# Patient Record
Sex: FEMALE | Race: OTHER | NOT HISPANIC OR LATINO | Employment: FULL TIME | URBAN - METROPOLITAN AREA
[De-identification: names, ages, dates, MRNs, and addresses within clinical notes are randomized per-mention and may not be internally consistent; named-entity substitution may affect disease eponyms.]

---

## 2017-10-12 DIAGNOSIS — G43.009 MIGRAINE WITHOUT AURA AND WITHOUT STATUS MIGRAINOSUS, NOT INTRACTABLE: ICD-10-CM

## 2017-10-12 DIAGNOSIS — G40.219 PARTIAL EPILEPSY WITH IMPAIRMENT OF CONSCIOUSNESS, INTRACTABLE (HCC): ICD-10-CM

## 2017-10-12 RX ORDER — ZONISAMIDE 100 MG/1
400 CAPSULE ORAL EVERY EVENING
Qty: 120 CAP | Refills: 11 | Status: SHIPPED | OUTPATIENT
Start: 2017-10-12 | End: 2018-10-05 | Stop reason: SDUPTHER

## 2017-10-13 NOTE — TELEPHONE ENCOUNTER
Was the patient seen in the last year in this department? No     Does patient have an active prescription for medications requested? Yes     Received Request Via: Patient         Patient called and stated she was unaware of the last two appointments scheduled with Dr. Ware in Oct. 2016 and Nov. 2016 and does not remember scheduling them; she was a no-show. She is scheduled to follow up with him in January 2018.

## 2018-01-25 ENCOUNTER — OFFICE VISIT (OUTPATIENT)
Dept: NEUROLOGY | Facility: MEDICAL CENTER | Age: 33
End: 2018-01-25
Payer: COMMERCIAL

## 2018-01-25 VITALS
BODY MASS INDEX: 30.97 KG/M2 | TEMPERATURE: 97.7 F | HEIGHT: 63 IN | OXYGEN SATURATION: 100 % | WEIGHT: 174.8 LBS | HEART RATE: 87 BPM | DIASTOLIC BLOOD PRESSURE: 62 MMHG | SYSTOLIC BLOOD PRESSURE: 116 MMHG | RESPIRATION RATE: 17 BRPM

## 2018-01-25 DIAGNOSIS — G43.009 MIGRAINE WITHOUT AURA AND WITHOUT STATUS MIGRAINOSUS, NOT INTRACTABLE: ICD-10-CM

## 2018-01-25 DIAGNOSIS — G40.219 PARTIAL EPILEPSY WITH IMPAIRMENT OF CONSCIOUSNESS, INTRACTABLE (HCC): Primary | ICD-10-CM

## 2018-01-25 PROCEDURE — 99214 OFFICE O/P EST MOD 30 MIN: CPT | Performed by: PSYCHIATRY & NEUROLOGY

## 2018-01-25 RX ORDER — ZOLMITRIPTAN 5 MG/1
SPRAY NASAL
Qty: 8 EACH | Refills: 1 | Status: SHIPPED | OUTPATIENT
Start: 2018-01-25 | End: 2020-01-06

## 2018-01-25 ASSESSMENT — ENCOUNTER SYMPTOMS
MEMORY LOSS: 0
INSOMNIA: 0
CONSTIPATION: 0
HEADACHES: 1
LOSS OF CONSCIOUSNESS: 0
DEPRESSION: 0
DIZZINESS: 1
SPUTUM PRODUCTION: 0
SEIZURES: 1

## 2018-01-25 ASSESSMENT — PAIN SCALES - GENERAL: PAINLEVEL: NO PAIN

## 2018-01-25 ASSESSMENT — PATIENT HEALTH QUESTIONNAIRE - PHQ9: CLINICAL INTERPRETATION OF PHQ2 SCORE: 0

## 2018-01-26 NOTE — PROGRESS NOTES
Subjective:      Shivani Wiley is a 32 y.o. female who presents for follow-up, last seen in April, 2016, with a history of simple partial seizures and migraine without aura.     HPI    Seizures: On Zonegran 400 mg daily at bedtime, when last seen, she had been seizure free for quite some time. In the interval since her last appointment, she was doing very well, and then things started to go south spontaneously about 4 weeks ago. She describes the events as a sudden metallic taste with some dizziness especially if she stands up too quickly, impaired concentration and headache. The latter is different than her migraines, occipital in location. Afterwards she is a little bit tired and slowed but she recovers. She had 2 of these events in rapid succession, this occurring on 2 consecutive days, she then was event free for 2 weeks, then she had a single event 2 weeks ago, none since. There is no diurnal pattern to their occurrence. She denies any atypical stressors, she has been compliant with medication, she was not started on any other new medications. She recalls that her seizures in the past did have a metallic taste and impaired concentration.    Migraine: Headaches also have continued in a more frequent pattern though over the longer term. They have not increased recently. She averages a headache twice a month, these now last a couple of days in duration. Off hormones, with an IUD in place, over the last several months, with her menses were occurring, she notes the headaches occurring consistently at that time. She had not refilled her Migranal nasal spray or her Zomig-ZMT rescue, using Anaprox only which provided limited benefit.    Medical, surgical and family histories are reviewed in the electronic health record, there are no new drug allergies. She has not had an EEG study done in quite some time. Socially, things are stable. She is still taking care of 2 children, working full-time as a nurse. She is on  "Zonegran 400 mg daily at bedtime, Anaprox  mg when necessary, she has Vicodin available, does not use it.    Review of Systems   Constitutional: Negative for malaise/fatigue.   Respiratory: Negative for sputum production.    Gastrointestinal: Negative for constipation.   Genitourinary: Negative for frequency and urgency.   Neurological: Positive for dizziness, seizures and headaches. Negative for loss of consciousness.   Psychiatric/Behavioral: Negative for depression and memory loss. The patient does not have insomnia.    All other systems reviewed and are negative.       Objective:     /62   Pulse 87   Temp 36.5 °C (97.7 °F)   Resp 17   Ht 1.6 m (5' 3\")   Wt 79.3 kg (174 lb 12.8 oz)   SpO2 100%   BMI 30.96 kg/m²      Physical Exam    She appears in no acute distress. Her vital signs are stable. There is no malar rash or temporal tenderness. The neck is supple, range of motion is full. Cardiac evaluation is unremarkable.    She is fully oriented, there is no aphasia or inattention. PERRLA/EOMI, visual fields are full, there is no facial asymmetry, bulbar dysfunction, the tongue and uvula are midline, sensory exam is intact across the midline to temperature, shoulder shrug and head rotation are intact. There is no tremor, asterixis, or drift. Tone is normal, strength is intact bilaterally. Reflexes are brisk and present at all points without asymmetries. There are no pathologic reflexes. There is no appendicular or truncal dystaxia. Fine motor control with the hands is also intact. There is no sensory loss to vibration.     Assessment/Plan:     1. Partial epilepsy with impairment of consciousness, intractable (CMS-[facility])  It sounds like these events she is now having are seizure recurrences, the disconcerting thing is simply a spontaneous recurrence without clear cause or provoking circumstance. Fortunately things seem to have attenuated, she will keep track of these events for now, I will maintain " Zonegran 400 mg daily at bedtime, but she was reassured we can increase the dose if the events were to begin to increase again. She is still safe to drive. An EEG will be ordered. Depending on these results, imaging of the brain may then be necessary. She was told we are looking for both nonconvulsive interictal activity, which would increase her risk into the future of having more seizures, as well as being able to identify and localize any abnormalities.    - REFERRAL TO NEURODIAGNOSTICS (EEG,EP,EMG/NCS/DBS) Modality Requested: EEG-Video    2. Migraine without aura and without status migrainosus, not intractable  We will use Zomig nasal spray as a more effective rescue. Twice a month headaches though not ideal, certainly do not warrant adjusting her maintenance therapies further, though we may need to go they're simply because of better seizure control if indicated. She is comfortable with this. The use of the nasal spray was demonstrated since technique is critical for efficacy. We will follow-up after her diagnostic studies done, phone contact in the interim.    - zolmitriptan (ZOMIG) 5 MG nasal solution; 1 spray at headache onset; repeat in 1 hour prn up to #2 spray in 24 hours.  Dispense: 8 Each; Refill: 1    Time: Evaluation of 25 minutes for exam come review, discussion, and education  Discussion: As mentioned in the assessment, over 60% of the time spent face-to-face counseling and coordination care

## 2018-05-30 ENCOUNTER — APPOINTMENT (OUTPATIENT)
Dept: NEUROLOGY | Facility: MEDICAL CENTER | Age: 33
End: 2018-05-30
Payer: COMMERCIAL

## 2018-09-11 ENCOUNTER — TELEPHONE (OUTPATIENT)
Dept: NEUROLOGY | Facility: MEDICAL CENTER | Age: 33
End: 2018-09-11

## 2018-09-11 NOTE — TELEPHONE ENCOUNTER
Patient called for Dr. Ware stating she has been in a bad migraine cycle for the last 2 weeks and her Zomig is not working for it. Is there a different medication she can try? Please advise.

## 2018-09-21 ENCOUNTER — NON-PROVIDER VISIT (OUTPATIENT)
Dept: NEUROLOGY | Facility: MEDICAL CENTER | Age: 33
End: 2018-09-21
Payer: COMMERCIAL

## 2018-09-21 DIAGNOSIS — G40.219 PARTIAL EPILEPSY WITH IMPAIRMENT OF CONSCIOUSNESS, INTRACTABLE (HCC): Primary | ICD-10-CM

## 2018-09-21 PROCEDURE — 95951 PR EEG MONITORING/VIDEORECORD: CPT | Performed by: PSYCHIATRY & NEUROLOGY

## 2018-09-26 NOTE — PROCEDURES
VIDEO ELECTROENCEPHALOGRAM REPORT      Referring provider: Dr. Castellon    DOS:  September 21, 2018, recording time 27 minutes    INDICATION:  Shivani Wiley 33 y.o. female presenting with a history of focal seizures without altered sensorium and migraine headache, now with recurrent events of metallic taste and dizziness. EEG being requested to rule out additional seizure activity and to help with localization.    CURRENT ANTIEPILEPTIC REGIMEN:  She is presently on Zonegran 400 mg daily at bedtime.    TECHNIQUE: 30 channel video electroencephalogram (EEG) was performed in accordance with the international 10-20 system. The study was reviewed in bipolar and referential montages. The recording examined the patient during wakeful and drowsy/sleep state(s).     DESCRIPTION OF THE RECORD:  During the wakefulness, the background showed a symmetrical 8-10 Hz alpha activity posteriorly with amplitude of ~70 mV.  There was reactivity to eye closure/opening.  A normal anterior-posterior gradient was noted with faster beta frequencies seen anteriorly. Brief intervals of slowing are seen consisting of high amplitude, sharply contoured theta activity with a right frontal localization. Typically less than one second duration, not associated with any change in the patients behavior and video.  During drowsiness, theta/delta frequencies were seen, the paroxysmal slowing becomes more sharply contoured, often preceded by sharp and slow wave complexes, now consisting of high amplitude delta wave forms, duration upwards of 3 seconds. During these discharges, there is a spontaneous contralateral left frontal activation seen.    During the sleep state, background shows diffuse high-amplitude 4-5 Hz delta activity.  Symmetrical high-amplitude sleep spindles and vertex sharps were seen in the leads over the central regions. The paroxysmal right frontal activity continues without increasing duration or frequency, always with the same  morphology. No sustained electroencephalographic seizure activity is seen during the tracing.     ACTIVATION PROCEDURES:   Hyperventilation was performed by the patient for a total of 3 minutes. The technician performing the test noted good effort. This technique produced electroencephalographic changes in keeping with the expected bilaterally synchronous, frontally predominant, high amplitude slow waves build up. There was no activation.     Intermittent Photic stimulation was performed in a stepwise fashion from 1 to 30 Hz and elicited minimal response (photic driving), most noticeable in the posterior leads. There was no activation.    EKG: sampling of the EKG recording demonstrated sinus rhythm.     INTERPRETATION:  This is an abnormal video EEG recording in the awake, drowsy/sleep state(s).  The findings are consistent with a right hemispheric localized epilepsy with rapid secondary generalization, and with rare left hemispheric, frontal involvement at outset. No evidence of nonconvulsive continuous electroencephalographic seizure activity is seen. Clinical correlation is recommended.        Khanh Ware M.D.    Procedures

## 2018-10-05 DIAGNOSIS — G40.219 PARTIAL EPILEPSY WITH IMPAIRMENT OF CONSCIOUSNESS, INTRACTABLE (HCC): ICD-10-CM

## 2018-10-05 DIAGNOSIS — G43.009 MIGRAINE WITHOUT AURA AND WITHOUT STATUS MIGRAINOSUS, NOT INTRACTABLE: ICD-10-CM

## 2018-10-08 RX ORDER — ZONISAMIDE 100 MG/1
400 CAPSULE ORAL EVERY EVENING
Qty: 120 CAP | Refills: 11 | Status: SHIPPED | OUTPATIENT
Start: 2018-10-08 | End: 2020-01-06 | Stop reason: SDUPTHER

## 2019-01-31 ENCOUNTER — APPOINTMENT (OUTPATIENT)
Dept: NEUROLOGY | Facility: MEDICAL CENTER | Age: 34
End: 2019-01-31
Payer: COMMERCIAL

## 2020-01-06 ENCOUNTER — OFFICE VISIT (OUTPATIENT)
Dept: NEUROLOGY | Facility: MEDICAL CENTER | Age: 35
End: 2020-01-06
Payer: COMMERCIAL

## 2020-01-06 VITALS
WEIGHT: 179 LBS | BODY MASS INDEX: 31.71 KG/M2 | SYSTOLIC BLOOD PRESSURE: 126 MMHG | HEART RATE: 68 BPM | HEIGHT: 63 IN | DIASTOLIC BLOOD PRESSURE: 80 MMHG | OXYGEN SATURATION: 99 %

## 2020-01-06 DIAGNOSIS — G40.219 PARTIAL EPILEPSY WITH IMPAIRMENT OF CONSCIOUSNESS, INTRACTABLE (HCC): Primary | ICD-10-CM

## 2020-01-06 DIAGNOSIS — G43.009 MIGRAINE WITHOUT AURA AND WITHOUT STATUS MIGRAINOSUS, NOT INTRACTABLE: ICD-10-CM

## 2020-01-06 PROCEDURE — 99213 OFFICE O/P EST LOW 20 MIN: CPT | Performed by: PSYCHIATRY & NEUROLOGY

## 2020-01-06 RX ORDER — ELETRIPTAN HYDROBROMIDE 40 MG/1
TABLET, FILM COATED ORAL
Qty: 9 TAB | Refills: 4 | Status: SHIPPED | OUTPATIENT
Start: 2020-01-06 | End: 2020-03-20

## 2020-01-06 RX ORDER — PROMETHAZINE HYDROCHLORIDE 25 MG/1
25 TABLET ORAL EVERY 4 HOURS PRN
Qty: 45 TAB | Refills: 1 | Status: SHIPPED | OUTPATIENT
Start: 2020-01-06 | End: 2021-02-08 | Stop reason: SDUPTHER

## 2020-01-06 RX ORDER — ZONISAMIDE 100 MG/1
400 CAPSULE ORAL EVERY EVENING
Qty: 120 CAP | Refills: 11 | Status: SHIPPED | OUTPATIENT
Start: 2020-01-06 | End: 2021-02-08 | Stop reason: SDUPTHER

## 2020-01-06 ASSESSMENT — ENCOUNTER SYMPTOMS
MEMORY LOSS: 0
PHOTOPHOBIA: 0
HEADACHES: 1
LOSS OF CONSCIOUSNESS: 0
SEIZURES: 0

## 2020-01-06 NOTE — PROGRESS NOTES
Subjective:      Shivani Wiley is a 34 y.o. female who presents with her , for follow-up, after nearly 2-year hiatus, with a history of migraine without aura and focal onset seizure with confusion and without motor symptoms.     HPI    Seizures: Still on Zonegran 400 mg nightly, she is compliant with the drug.  She is never had problems with tolerability.  Her EEG was done soon after last seen in January, 2018, and this showed a right hemispheric localization with rapid secondary generalization, localized mostly over the right temporal lobe.  There is no evidence of sustained nonconvulsive seizure activity.  She has not had MRI imaging of the brain done.    Migraine: She still has them maybe twice a month, but there has been a more consistent menstrual association.  She has a hormone-based IUD in place, and though she has only occasional menstrual flow, there is a consistent monthly pattern to when she gets her headaches.  The headaches can start while she is asleep, so she awakens already with significant pain and other symptoms.  When they do happen they still last about 2 days.    For treatment, she is already failed Topamax and Depakote, Migranal nasal spray originally provided some benefit but no longer this is the case.  Zomig 5 mg failed either as a ZMT formulation or nasal spray.    Medically, she has been having more more problems with dyspepsia, work-up has been rather thorough, essentially she is now banned from all NSAIDs.  She had been taking quite a lot for the headaches leading up to this.  Otherwise, surgical and family histories are reviewed in the electronic health record, there are no new drug allergies.  Other than Zonegran 40 mg nightly, she is on no other medications.    Review of Systems   Constitutional: Negative for malaise/fatigue.   Eyes: Negative for photophobia.   Neurological: Positive for headaches. Negative for seizures and loss of consciousness.   Psychiatric/Behavioral:  "Negative for memory loss.   All other systems reviewed and are negative.       Objective:     /80 (BP Location: Left arm, Patient Position: Sitting, BP Cuff Size: Adult)   Pulse 68   Ht 1.6 m (5' 3\")   Wt 81.2 kg (179 lb)   SpO2 99%   BMI 31.71 kg/m²      Physical Exam    She appears in no acute distress.  Her vital signs are stable.  There is no malar rash, jaw or temporal tenderness, jaw claudication, or allodynia.  The neck is supple, range of motion is full.  Carotid pulses are present bilaterally without asymmetry.  Cardiac evaluation is unremarkable.    Including mental status, cranial nerves, musculoskeletal, reflex, coordination, and since evaluations, her full neurologic examination is done and reveals no evidence of deficits nor toxicities.     Assessment/Plan:     1. Partial epilepsy with impairment of consciousness, intractable (HCC)  Clinically stable, imaging of the brain does need to be performed to better understand if there is a focal lesion involving the right hemisphere, and this could include MTS, migrational abnormalities or cortical dysgenesis.  This can be helpful in terms of prognostication though it certainly does not mean we need to change her medication regimen.  She understands that she will need to be on medication likely for the rest of her life.  She know circumstances that lower thresholds, this includes compliance or lack thereof with her medications. We will follow-up in 4 months following her MRI scan, we will call her with the results earlier if needed.    - zonisamide (ZONEGRAN) 100 MG Cap; Take 4 Caps by mouth every evening.  Dispense: 120 Cap; Refill: 11  - MR-BRAIN-W/O; Future    2. Migraine without aura and without status migrainosus, not intractable  Though she is stable, she is still needing some better rescue when a headache were to occur.  Relpax 40 mg to be taken with Phenergan 25 mg, this cocktail can be repeated within an hour if needed, of the interval can " be longer.  Side effects were reviewed, she was warned that Phenergan can make people sleepy.     - zonisamide (ZONEGRAN) 100 MG Cap; Take 4 Caps by mouth every evening.  Dispense: 120 Cap; Refill: 11  - eletriptan (RELPAX) 40 MG tablet; 1 tab at headache onset; repeat in 1 hour prn  Dispense: 9 Tab; Refill: 4  - promethazine (PHENERGAN) 25 MG Tab; Take 1 Tab by mouth every four hours as needed for Nausea/Vomiting.  Dispense: 45 Tab; Refill: 1    Time: 20-minute spent face-to-face for exam, review, discussion, and education, of this over 50% of the time spent counseling and coordinating care.

## 2020-01-09 ENCOUNTER — TELEPHONE (OUTPATIENT)
Dept: NEUROLOGY | Facility: MEDICAL CENTER | Age: 35
End: 2020-01-09

## 2020-01-09 NOTE — TELEPHONE ENCOUNTER
Patient called today and asked if we can fax her MRI of the Brain to [phone].  Faxed order to them.

## 2020-03-20 DIAGNOSIS — G43.009 MIGRAINE WITHOUT AURA AND WITHOUT STATUS MIGRAINOSUS, NOT INTRACTABLE: ICD-10-CM

## 2020-03-20 RX ORDER — RIZATRIPTAN BENZOATE 10 MG/1
TABLET, ORALLY DISINTEGRATING ORAL
Qty: 12 TAB | Refills: 6 | Status: SHIPPED | OUTPATIENT
Start: 2020-03-20 | End: 2020-05-13 | Stop reason: CLARIF

## 2020-03-20 NOTE — PROGRESS NOTES
Notification was received from the patient's pharmacy that Relpax was not covered, recommendations for Maxalt MLT 10 mg wafers as a covered drug were made, I will forward this prescription to the pharmacy as directed.  The patient can receive up to 18 tablets in a month.  A prescription will be written for 12 tablets/month, written to the pharmacy at [facility] in [place], [place].

## 2020-05-13 ENCOUNTER — TELEPHONE (OUTPATIENT)
Dept: NEUROLOGY | Facility: MEDICAL CENTER | Age: 35
End: 2020-05-13

## 2020-05-13 DIAGNOSIS — G43.009 MIGRAINE WITHOUT AURA AND WITHOUT STATUS MIGRAINOSUS, NOT INTRACTABLE: ICD-10-CM

## 2020-05-13 RX ORDER — RIZATRIPTAN BENZOATE 10 MG/1
TABLET ORAL
Qty: 10 TAB | Refills: 6 | Status: SHIPPED | OUTPATIENT
Start: 2020-05-13 | End: 2020-05-13

## 2020-05-13 RX ORDER — FROVATRIPTAN SUCCINATE 2.5 MG/1
TABLET, FILM COATED ORAL
Qty: 12 TAB | Refills: 2 | Status: SHIPPED | OUTPATIENT
Start: 2020-05-13 | End: 2021-02-08 | Stop reason: SDUPTHER

## 2020-05-13 NOTE — TELEPHONE ENCOUNTER
Dr. Ware,    Patient called on 5/13/2020 and stated that she has had a migraine since 05/07/2020 and stated that the medications are not working and would like to know if there is an alternative or what she can do?    Please Advise   MMR , 2019/7/21 10:36 , Federica Boyd (RN)

## 2020-05-14 NOTE — TELEPHONE ENCOUNTER
I will try another triptan, but if this fails, unfortunately it means she will need to go to an urgent care center for IV medications.  When she says nothing is working, I am assuming this means the Maxalt MLT that she was given as rescue.  She is already failed Imitrex and Zomig, I will try Frova.  I forwarded a prescription to the pharmacy.

## 2020-08-13 ENCOUNTER — APPOINTMENT (RX ONLY)
Dept: URBAN - METROPOLITAN AREA CLINIC 4 | Facility: CLINIC | Age: 35
Setting detail: DERMATOLOGY
End: 2020-08-13

## 2020-08-13 DIAGNOSIS — L81.4 OTHER MELANIN HYPERPIGMENTATION: ICD-10-CM

## 2020-08-13 DIAGNOSIS — I83.9 ASYMPTOMATIC VARICOSE VEINS OF LOWER EXTREMITIES: ICD-10-CM

## 2020-08-13 DIAGNOSIS — L81.1 CHLOASMA: ICD-10-CM

## 2020-08-13 PROBLEM — D48.5 NEOPLASM OF UNCERTAIN BEHAVIOR OF SKIN: Status: ACTIVE | Noted: 2020-08-13

## 2020-08-13 PROCEDURE — ? ADDITIONAL NOTES

## 2020-08-13 PROCEDURE — ? COUNSELING

## 2020-08-13 PROCEDURE — ? PRESCRIPTION

## 2020-08-13 PROCEDURE — 99202 OFFICE O/P NEW SF 15 MIN: CPT

## 2020-08-13 RX ORDER — FLUOCINOLONE ACETONIDE, HYDROQUINONE, AND TRETINOIN .1; 40; .5 MG/G; MG/G; MG/G
1 CREAM TOPICAL QHS
Qty: 1 | Refills: 3 | Status: ERX | COMMUNITY
Start: 2020-08-13

## 2020-08-13 RX ADMIN — FLUOCINOLONE ACETONIDE, HYDROQUINONE, AND TRETINOIN 1: .1; 40; .5 CREAM TOPICAL at 00:00

## 2020-08-13 ASSESSMENT — LOCATION SIMPLE DESCRIPTION DERM
LOCATION SIMPLE: INFERIOR FOREHEAD
LOCATION SIMPLE: LEFT FOREARM
LOCATION SIMPLE: RIGHT BREAST
LOCATION SIMPLE: RIGHT FOREHEAD

## 2020-08-13 ASSESSMENT — LOCATION DETAILED DESCRIPTION DERM
LOCATION DETAILED: INFERIOR MID FOREHEAD
LOCATION DETAILED: RIGHT LATERAL BREAST 10-11:00 REGION
LOCATION DETAILED: RIGHT INFERIOR MEDIAL FOREHEAD
LOCATION DETAILED: LEFT VENTRAL PROXIMAL FOREARM

## 2020-08-13 ASSESSMENT — LOCATION ZONE DERM
LOCATION ZONE: ARM
LOCATION ZONE: TRUNK
LOCATION ZONE: FACE

## 2020-08-13 NOTE — PROCEDURE: ADDITIONAL NOTES
Detail Level: Simple
Additional Notes: The importance of zinc based sunscreen was discussed, reapplication frequency emphasized
Additional Notes: will monitor

## 2021-02-08 ENCOUNTER — OFFICE VISIT (OUTPATIENT)
Dept: NEUROLOGY | Facility: MEDICAL CENTER | Age: 36
End: 2021-02-08
Attending: PSYCHIATRY & NEUROLOGY
Payer: COMMERCIAL

## 2021-02-08 VITALS
BODY MASS INDEX: 34.52 KG/M2 | SYSTOLIC BLOOD PRESSURE: 110 MMHG | OXYGEN SATURATION: 99 % | DIASTOLIC BLOOD PRESSURE: 76 MMHG | HEIGHT: 62 IN | HEART RATE: 68 BPM | WEIGHT: 187.61 LBS | TEMPERATURE: 98.4 F

## 2021-02-08 DIAGNOSIS — G43.009 MIGRAINE WITHOUT AURA AND WITHOUT STATUS MIGRAINOSUS, NOT INTRACTABLE: ICD-10-CM

## 2021-02-08 DIAGNOSIS — G40.219 PARTIAL EPILEPSY WITH IMPAIRMENT OF CONSCIOUSNESS, INTRACTABLE (HCC): Primary | ICD-10-CM

## 2021-02-08 PROCEDURE — 99214 OFFICE O/P EST MOD 30 MIN: CPT | Performed by: PSYCHIATRY & NEUROLOGY

## 2021-02-08 RX ORDER — PROMETHAZINE HYDROCHLORIDE 25 MG/1
25 TABLET ORAL EVERY 4 HOURS PRN
Qty: 45 TAB | Refills: 5 | Status: SHIPPED | OUTPATIENT
Start: 2021-02-08

## 2021-02-08 RX ORDER — ZONISAMIDE 100 MG/1
400 CAPSULE ORAL EVERY EVENING
Qty: 120 CAP | Refills: 11 | Status: SHIPPED | OUTPATIENT
Start: 2021-02-08 | End: 2021-10-14 | Stop reason: SDUPTHER

## 2021-02-08 RX ORDER — FROVATRIPTAN SUCCINATE 2.5 MG/1
TABLET, FILM COATED ORAL
Qty: 12 TAB | Refills: 11 | Status: SHIPPED | OUTPATIENT
Start: 2021-02-08 | End: 2022-03-21

## 2021-02-08 RX ORDER — UBROGEPANT 100 MG/1
1 TABLET ORAL PRN
Qty: 2 TAB | Refills: 0 | COMMUNITY
Start: 2021-02-08 | End: 2021-03-19 | Stop reason: SDUPTHER

## 2021-02-08 ASSESSMENT — PATIENT HEALTH QUESTIONNAIRE - PHQ9: CLINICAL INTERPRETATION OF PHQ2 SCORE: 0

## 2021-02-08 ASSESSMENT — ENCOUNTER SYMPTOMS
HEADACHES: 1
INSOMNIA: 0
LOSS OF CONSCIOUSNESS: 0
MEMORY LOSS: 0
SEIZURES: 0

## 2021-02-08 NOTE — PROGRESS NOTES
"Subjective:      Shivani Wiley is a 35 y.o. female who presents for follow-up, with a history of focal onset seizures and migraine without aura.     HPI    Seizures: Shivani states that she has had good seizure control.  On an occasion she will awaken in the morning having bitten the side of her tongue, but she denies headache or other malaise.  This is infrequent.  She is compliant with Zonegran 400 mg nightly.  She is still dealing with the a.m. grogginess from the drug.  She is compliant.    Migraine: Still with that menstrual association, she remains with her IUD, she is secondarily amenorrheic, but once a month the headaches still come back.  They can last 2 days or more, last year she actually had several occasions where the headache went for 5 days.  You tried Maxalt, she is already failed Imitrex and Zomig, this provided limited benefit.  She now uses Frova 2.5 mg which has provided the best benefit to date with Phenergan, at least the headaches do not last for days at a time.    Medical, surgical and family histories are reviewed, there are no new drug allergies.  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  Other than the Zonegran, Frova and Phenergan, and her hormone IUD, she is on no other medications.    Review of Systems   Constitutional: Negative for malaise/fatigue.   Neurological: Positive for headaches. Negative for seizures and loss of consciousness.   Psychiatric/Behavioral: Negative for memory loss. The patient does not have insomnia.    All other systems reviewed and are negative.       Objective:     /76 (BP Location: Right arm, Patient Position: Sitting, BP Cuff Size: Adult)   Pulse 68   Temp 36.9 °C (98.4 °F) (Temporal)   Ht 1.575 m (5' 2\")   Wt 85.1 kg (187 lb 9.8 oz)   SpO2 99%   BMI 34.31 kg/m²      Physical Exam    She appears in no acute " distress.  Her vital signs are stable.  There is no malar rash or jaw claudication.  There is no temporal tenderness.  The neck is supple, range of motion is full.  Cardiac evaluation is unremarkable.    Including mental status, cranial nerves, musculoskeletal, reflex, coordination, and since evaluations, the full neurologic examination is done and reveals no evidence of deficits or toxicities.     Assessment/Plan:     1. Partial epilepsy with impairment of consciousness, intractable (HCC)  Clinically stable, we will continue Zonegran 400 mg nightly.  The a.m. drugged feeling is part of the drug side effect, we talked about changing her to a different medicine, but given her past failures with Topamax and Depakote, she is still very uncomfortable with trying to change again.  She is dealing with the a.m. side effects, she is happy for now.  She is compliant.    - zonisamide (ZONEGRAN) 100 MG Cap; Take 4 Caps by mouth every evening.  Dispense: 120 Cap; Refill: 11    2. Migraine without aura and without status migrainosus, not intractable  The more active issue, we will try Ubrelvy 100 mg tablets instead of Frova, though she was told she can was rescue with the latter if the former proves ineffective.  A CGRP, Ubrelvy has been shown to be quite effective, very few side effects and no interactions with other medicines.  She will call the office to let us know how things move along in this regard.  Otherwise we can follow-up in 1 year.    - zonisamide (ZONEGRAN) 100 MG Cap; Take 4 Caps by mouth every evening.  Dispense: 120 Cap; Refill: 11  - Frovatriptan Succinate (FROVA) 2.5 MG Tab; 1 tab at headache onset; repeat in 1 hour prn  Dispense: 12 Tab; Refill: 11  - promethazine (PHENERGAN) 25 MG Tab; Take 1 Tab by mouth every four hours as needed for Nausea/Vomiting.  Dispense: 45 Tab; Refill: 5  - Ubrogepant (UBRELVY) 100 MG Tab; Take 1 Tab by mouth as needed (1 tab at headache osnet, repeat in 2 hour prn).  Dispense: 2  Tab; Refill: 0    Time: 20-minute spent face-to-face for exam, review, discussion, and education, of this over 50% of the time spent counseling and coordinating care.

## 2021-03-19 DIAGNOSIS — G43.009 MIGRAINE WITHOUT AURA AND WITHOUT STATUS MIGRAINOSUS, NOT INTRACTABLE: ICD-10-CM

## 2021-03-19 RX ORDER — UBROGEPANT 100 MG/1
1 TABLET ORAL PRN
Qty: 10 TABLET | Refills: 5 | Status: SHIPPED | OUTPATIENT
Start: 2021-03-19 | End: 2022-03-21

## 2021-06-08 ENCOUNTER — TELEPHONE (OUTPATIENT)
Dept: NEUROLOGY | Facility: MEDICAL CENTER | Age: 36
End: 2021-06-08

## 2021-06-08 NOTE — TELEPHONE ENCOUNTER
Patient called leaving a detailed message specifying she has dizzy the last two weeks in the morning , also feels very shaky .    Patient is wondering if that may be related to her current prescription regimen ?

## 2021-06-09 NOTE — TELEPHONE ENCOUNTER
Tell her that this is not likely due to the medications we are using.  She has been on stable doses for quite some time.  Something else is going on, she needs to talk with her PCP about this.

## 2021-07-31 ENCOUNTER — TELEPHONE (OUTPATIENT)
Dept: NEUROLOGY | Facility: MEDICAL CENTER | Age: 36
End: 2021-07-31

## 2021-11-12 ENCOUNTER — TELEPHONE (OUTPATIENT)
Dept: NEUROLOGY | Facility: MEDICAL CENTER | Age: 36
End: 2021-11-12

## 2021-11-12 NOTE — TELEPHONE ENCOUNTER
Ask Shivani if the Toradol shot from yesterday also helped the left facial numbness as well as the headache, though of course it would have been short-lived?

## 2021-11-12 NOTE — TELEPHONE ENCOUNTER
Per patient- had a Toradol shot and migraine cocktail yesterday. Medication only worked for one hour. Wanted to know if she have to go back to this office and get another shot or can you please prescribe a medication that will help her migraine and numbness on left side of the face? Patient live in [place]. Please advise. Thank you.BENJI Collazo.

## 2021-11-13 NOTE — TELEPHONE ENCOUNTER
Let her know that I am a little concerned about the new left-sided facial symptoms and there persistence along with a headache that is proving more refractory than usual.  She probably should get to an emergency room or an urgent care center, imaging of the brain should probably be done for thoroughness.  I doubt we will find anything significant, but you can never be sure.

## 2022-03-03 ENCOUNTER — TELEPHONE (OUTPATIENT)
Dept: NEUROLOGY | Facility: MEDICAL CENTER | Age: 37
End: 2022-03-03
Payer: COMMERCIAL

## 2022-03-03 DIAGNOSIS — G43.009 MIGRAINE WITHOUT AURA AND WITHOUT STATUS MIGRAINOSUS, NOT INTRACTABLE: ICD-10-CM

## 2022-03-03 DIAGNOSIS — G40.219 PARTIAL EPILEPSY WITH IMPAIRMENT OF CONSCIOUSNESS, INTRACTABLE (HCC): ICD-10-CM

## 2022-03-03 RX ORDER — ZONISAMIDE 100 MG/1
400 CAPSULE ORAL EVERY EVENING
Qty: 360 CAPSULE | Refills: 3 | Status: SHIPPED | OUTPATIENT
Start: 2022-03-03 | End: 2023-02-26

## 2022-03-03 NOTE — TELEPHONE ENCOUNTER
Received request via: Patient    Was the patient seen in the last year in this department? No     Does the patient have an active prescription (recently filled or refills available) for medication(s) requested? No     Patient message:  Can I please have a refill of Zonisamide. The pharmacy has sent a request 3 days ago. I have a scheduled appointment on 3/21.     I want to prevent going with out like the last time.     Thank you,   Shivani

## 2022-03-04 NOTE — TELEPHONE ENCOUNTER
We never got any request from the pharmacy as the pt was told. Regardless, the prescription was renewed today for the next year.

## 2022-03-21 ENCOUNTER — OFFICE VISIT (OUTPATIENT)
Dept: NEUROLOGY | Facility: MEDICAL CENTER | Age: 37
End: 2022-03-21
Attending: PSYCHIATRY & NEUROLOGY
Payer: COMMERCIAL

## 2022-03-21 ENCOUNTER — TELEPHONE (OUTPATIENT)
Dept: NEUROLOGY | Facility: MEDICAL CENTER | Age: 37
End: 2022-03-21

## 2022-03-21 VITALS
HEART RATE: 69 BPM | HEIGHT: 62 IN | SYSTOLIC BLOOD PRESSURE: 124 MMHG | WEIGHT: 191.36 LBS | DIASTOLIC BLOOD PRESSURE: 74 MMHG | TEMPERATURE: 97.3 F | BODY MASS INDEX: 35.21 KG/M2 | OXYGEN SATURATION: 99 %

## 2022-03-21 DIAGNOSIS — G40.219 PARTIAL EPILEPSY WITH IMPAIRMENT OF CONSCIOUSNESS, INTRACTABLE (HCC): ICD-10-CM

## 2022-03-21 DIAGNOSIS — G43.119 INTRACTABLE MIGRAINE WITH AURA WITHOUT STATUS MIGRAINOSUS: ICD-10-CM

## 2022-03-21 PROCEDURE — 99214 OFFICE O/P EST MOD 30 MIN: CPT | Performed by: PSYCHIATRY & NEUROLOGY

## 2022-03-21 PROCEDURE — 99211 OFF/OP EST MAY X REQ PHY/QHP: CPT | Performed by: PSYCHIATRY & NEUROLOGY

## 2022-03-21 RX ORDER — LASMIDITAN 100 MG/1
TABLET ORAL
Qty: 30 TABLET | Refills: 0 | COMMUNITY
Start: 2022-03-21 | End: 2022-04-19

## 2022-03-21 RX ORDER — FREMANEZUMAB-VFRM 225 MG/1.5ML
225 INJECTION SUBCUTANEOUS
Qty: 1 EACH | Refills: 11 | Status: SHIPPED | OUTPATIENT
Start: 2022-03-21 | End: 2023-01-02

## 2022-03-21 ASSESSMENT — ENCOUNTER SYMPTOMS
MEMORY LOSS: 1
PHOTOPHOBIA: 1
HEADACHES: 1
TINGLING: 1

## 2022-03-21 ASSESSMENT — PATIENT HEALTH QUESTIONNAIRE - PHQ9: CLINICAL INTERPRETATION OF PHQ2 SCORE: 0

## 2022-03-21 NOTE — TELEPHONE ENCOUNTER
Ajovy 225mg/1.5ml SOAJ    Request rec'd via DION, ran TC via Janny, MARIE paid copay $45.00 #1.5ml/30 DS or copay $135.00 #4.5ml/90 DS no $$ benefit as $45.00 per 30 DS notifying liaison Natalya Montesinos - patient lives in [place]. - 03/21/2022 4:00pm

## 2022-03-21 NOTE — PROGRESS NOTES
Subjective     Shivani Wiley is a 36 y.o. female who presents for follow-up, with a history of focal onset seizures with altered sensorium and migraine without aura, but who has begun to have an increase in headaches, now associated with left facial tingling sensations.     HPI    Migraine: Sophia had been having a consistent menstrual association with the headaches historically, but since last year, she has noted the headaches changing.  They have been increasing in frequency, she was now getting them between menses, still on an IUD with hormone, menses were typically some spotting.  She was now having near daily headaches for which Tylenol extra strength and Motrin 800 mg was being used consistently and more frequently, the former twice a day, the latter 4 times a day.  At least once every week she was having to reach for rescue medication, she has failed both Zomig and Imitrex, followed by Maxalt.  She was given samples of Ubrelvy which provided no benefit.  She essentially has no rescue medication available to her.    At some point she began to notice paresthesias involving the left side of the face, forehead and down the neck, occurring with the more severe headaches, though they do resolve as the headache improves.  She is also had baseline cognitive impairment, and heightened sensitivity to light, both on a near daily basis.    She did have an MRI scan of the brain done recently at her hospital, it was reportedly normal.  She was not having facial symptoms at the time of the scan itself.    Review of the records indicates that she has failed Topamax and Depakote because of side effects and adverse events.  She has not been on any other maintenance therapies since there had been no need.  During this time she has lost her sister to an intracranial hemorrhage, also increasing amounts of stress related to work at [employer].    Seizures: This at least has been stable, she is on Zonegran 400 mg  "nightly.  The problem has been getting the medication refilled for her on a timely basis.  She has called this office, responses have been slow, but also has had problems getting the pharmacy to give her the medication once it was called in.  Fortunately she has had no seizure recurrences.  She tolerates the Zonegran without major issue.     Medical, surgical and family histories are reviewed, there are no new drug allergies.  She is on Zonegran 400 mg nightly, Motrin 800 mg/Tylenol Extra Strength as needed, and Phenergan as needed.    Review of Systems   Eyes: Positive for photophobia.   Neurological: Positive for tingling and headaches.   Psychiatric/Behavioral: Positive for memory loss.   All other systems reviewed and are negative.      Objective     /74 (BP Location: Right arm, Patient Position: Sitting, BP Cuff Size: Adult)   Pulse 69   Temp 36.3 °C (97.3 °F) (Temporal)   Ht 1.575 m (5' 2\")   Wt 86.8 kg (191 lb 5.8 oz)   SpO2 99%   BMI 35.00 kg/m²      Physical Exam    She appears in no acute distress.  Vital signs are stable.  She is quite cooperative.  There is no malar rash, temporal or joint tenderness, or jaw claudication.  The neck is supple, range of motion is full.  Carotid pulses are present without asymmetry.  Cardiac evaluation is unremarkable.      Neurological Exam    In quick and cursory fashion, mental status, cranial nerve, musculoskeletal, coordination, and sensory evaluations are all intact.    Assessment & Plan     1. Intractable migraine with aura without status migrainosus  Headaches have taken off, he now has an associated aura, which can occur in about 18% of migraine suffers.  Quite often this happens in patients who have never had aura with her headaches for years.  MRI imaging is ruled out underlying structural pathology, obviously the symptoms are not related to her seizures, though the seizures have been a right hemispheric localization based on previous EEG.  In any " case, she does warrant preventative therapies, she is now fitting criteria for chronic migraine.    We spoke at length about treatment options available for chronic migraine.  I recommended Ajovy or Vyepti, she is opting for the former.  A 225 mg subcu injection every 4 weeks, this can also be given as a single time, 3 injection administration, every 3 months.  Side effects were reviewed.  Co-pay card was provided.  For rescue Reyvow 100 mg tablets are provided as samples, taken at pain onset and then repeated once in 24 hours.  Side effects were also reviewed.  She was reassured that using both these medicines are safe in patients with epilepsy, they do not interact with the Zonegran itself.  A co-pay card for the Reyvow was also provided.    - Fremanezumab-vfrm (AJOVY) 225 MG/1.5ML Solution Auto-injector; Inject 225 mg under the skin every 4 weeks.  Dispense: 1 Each; Refill: 11  - Lasmiditan Succinate (REYVOW) 100 MG Tab; 1 tab at headache onset; repeat once in 24 hours  Dispense: 30 Tablet; Refill: 0    2. Partial epilepsy with impairment of consciousness, intractable (HCC)  We will continue the Zonegran unchanged.  Hopefully, moving forwards, there will be as many problems obtaining medication when she calls in for refills.  We will follow-up in 6 months.    Time: 30 minutes in total spent in patient care including free charting, record review, discussions with healthcare staff and documentation.  This includes face-to-face time with the patient for exam, review, discussion, as well as counseling and coordinating care.

## 2022-08-03 ENCOUNTER — PRE-ADMISSION TESTING (OUTPATIENT)
Dept: ADMISSIONS | Facility: MEDICAL CENTER | Age: 37
End: 2022-08-03
Attending: OTOLARYNGOLOGY
Payer: COMMERCIAL

## 2022-08-23 ENCOUNTER — ANESTHESIA EVENT (OUTPATIENT)
Dept: SURGERY | Facility: MEDICAL CENTER | Age: 37
End: 2022-08-23
Payer: COMMERCIAL

## 2022-08-23 ENCOUNTER — PHARMACY VISIT (OUTPATIENT)
Dept: PHARMACY | Facility: MEDICAL CENTER | Age: 37
End: 2022-08-23
Payer: COMMERCIAL

## 2022-08-23 ENCOUNTER — ANESTHESIA (OUTPATIENT)
Dept: SURGERY | Facility: MEDICAL CENTER | Age: 37
End: 2022-08-23
Payer: COMMERCIAL

## 2022-08-23 ENCOUNTER — HOSPITAL ENCOUNTER (OUTPATIENT)
Facility: MEDICAL CENTER | Age: 37
End: 2022-08-23
Attending: OTOLARYNGOLOGY | Admitting: OTOLARYNGOLOGY
Payer: COMMERCIAL

## 2022-08-23 VITALS
WEIGHT: 184.97 LBS | TEMPERATURE: 97.3 F | DIASTOLIC BLOOD PRESSURE: 62 MMHG | HEIGHT: 62 IN | HEART RATE: 82 BPM | RESPIRATION RATE: 18 BRPM | OXYGEN SATURATION: 90 % | BODY MASS INDEX: 34.04 KG/M2 | SYSTOLIC BLOOD PRESSURE: 126 MMHG

## 2022-08-23 DIAGNOSIS — G89.18 POSTOPERATIVE PAIN: ICD-10-CM

## 2022-08-23 LAB — HCG UR QL: NEGATIVE

## 2022-08-23 PROCEDURE — A9270 NON-COVERED ITEM OR SERVICE: HCPCS | Performed by: STUDENT IN AN ORGANIZED HEALTH CARE EDUCATION/TRAINING PROGRAM

## 2022-08-23 PROCEDURE — 700101 HCHG RX REV CODE 250: Performed by: STUDENT IN AN ORGANIZED HEALTH CARE EDUCATION/TRAINING PROGRAM

## 2022-08-23 PROCEDURE — RXMED WILLOW AMBULATORY MEDICATION CHARGE: Performed by: OTOLARYNGOLOGY

## 2022-08-23 PROCEDURE — 700111 HCHG RX REV CODE 636 W/ 250 OVERRIDE (IP): Performed by: STUDENT IN AN ORGANIZED HEALTH CARE EDUCATION/TRAINING PROGRAM

## 2022-08-23 PROCEDURE — 700105 HCHG RX REV CODE 258: Performed by: OTOLARYNGOLOGY

## 2022-08-23 PROCEDURE — 160025 RECOVERY II MINUTES (STATS): Performed by: OTOLARYNGOLOGY

## 2022-08-23 PROCEDURE — 160046 HCHG PACU - 1ST 60 MINS PHASE II: Performed by: OTOLARYNGOLOGY

## 2022-08-23 PROCEDURE — 160035 HCHG PACU - 1ST 60 MINS PHASE I: Performed by: OTOLARYNGOLOGY

## 2022-08-23 PROCEDURE — 81025 URINE PREGNANCY TEST: CPT

## 2022-08-23 PROCEDURE — 700101 HCHG RX REV CODE 250: Performed by: OTOLARYNGOLOGY

## 2022-08-23 PROCEDURE — 160048 HCHG OR STATISTICAL LEVEL 1-5: Performed by: OTOLARYNGOLOGY

## 2022-08-23 PROCEDURE — 700111 HCHG RX REV CODE 636 W/ 250 OVERRIDE (IP)

## 2022-08-23 PROCEDURE — 700102 HCHG RX REV CODE 250 W/ 637 OVERRIDE(OP): Performed by: STUDENT IN AN ORGANIZED HEALTH CARE EDUCATION/TRAINING PROGRAM

## 2022-08-23 PROCEDURE — 160002 HCHG RECOVERY MINUTES (STAT): Performed by: OTOLARYNGOLOGY

## 2022-08-23 PROCEDURE — 160028 HCHG SURGERY MINUTES - 1ST 30 MINS LEVEL 3: Performed by: OTOLARYNGOLOGY

## 2022-08-23 PROCEDURE — 160009 HCHG ANES TIME/MIN: Performed by: OTOLARYNGOLOGY

## 2022-08-23 PROCEDURE — 160039 HCHG SURGERY MINUTES - EA ADDL 1 MIN LEVEL 3: Performed by: OTOLARYNGOLOGY

## 2022-08-23 PROCEDURE — 00160 ANES PX NOSE&SINUS NOS: CPT | Performed by: STUDENT IN AN ORGANIZED HEALTH CARE EDUCATION/TRAINING PROGRAM

## 2022-08-23 RX ORDER — EPINEPHRINE 1 MG/ML
INJECTION INTRAMUSCULAR; INTRAVENOUS; SUBCUTANEOUS
Status: DISCONTINUED
Start: 2022-08-23 | End: 2022-08-23 | Stop reason: HOSPADM

## 2022-08-23 RX ORDER — HYDROMORPHONE HYDROCHLORIDE 1 MG/ML
0.2 INJECTION, SOLUTION INTRAMUSCULAR; INTRAVENOUS; SUBCUTANEOUS
Status: DISCONTINUED | OUTPATIENT
Start: 2022-08-23 | End: 2022-08-23 | Stop reason: HOSPADM

## 2022-08-23 RX ORDER — HYDRALAZINE HYDROCHLORIDE 20 MG/ML
5 INJECTION INTRAMUSCULAR; INTRAVENOUS
Status: DISCONTINUED | OUTPATIENT
Start: 2022-08-23 | End: 2022-08-23 | Stop reason: HOSPADM

## 2022-08-23 RX ORDER — EPINEPHRINE 1 MG/ML(1)
AMPUL (ML) INJECTION
Status: DISCONTINUED
Start: 2022-08-23 | End: 2022-08-23 | Stop reason: HOSPADM

## 2022-08-23 RX ORDER — SODIUM CHLORIDE, SODIUM LACTATE, POTASSIUM CHLORIDE, CALCIUM CHLORIDE 600; 310; 30; 20 MG/100ML; MG/100ML; MG/100ML; MG/100ML
INJECTION, SOLUTION INTRAVENOUS CONTINUOUS
Status: DISCONTINUED | OUTPATIENT
Start: 2022-08-23 | End: 2022-08-23 | Stop reason: HOSPADM

## 2022-08-23 RX ORDER — ACETAMINOPHEN 500 MG
1000 TABLET ORAL ONCE
Status: COMPLETED | OUTPATIENT
Start: 2022-08-23 | End: 2022-08-23

## 2022-08-23 RX ORDER — EPINEPHRINE 1 MG/ML(1)
AMPUL (ML) INJECTION
Status: DISCONTINUED | OUTPATIENT
Start: 2022-08-23 | End: 2022-08-23 | Stop reason: HOSPADM

## 2022-08-23 RX ORDER — DEXAMETHASONE SODIUM PHOSPHATE 4 MG/ML
INJECTION, SOLUTION INTRA-ARTICULAR; INTRALESIONAL; INTRAMUSCULAR; INTRAVENOUS; SOFT TISSUE PRN
Status: DISCONTINUED | OUTPATIENT
Start: 2022-08-23 | End: 2022-08-23 | Stop reason: SURG

## 2022-08-23 RX ORDER — HYDROCODONE BITARTRATE AND ACETAMINOPHEN 5; 325 MG/1; MG/1
1 TABLET ORAL EVERY 4 HOURS PRN
Qty: 15 TABLET | Refills: 0 | Status: SHIPPED | OUTPATIENT
Start: 2022-08-23 | End: 2022-08-28

## 2022-08-23 RX ORDER — DIPHENHYDRAMINE HYDROCHLORIDE 50 MG/ML
12.5 INJECTION INTRAMUSCULAR; INTRAVENOUS
Status: DISCONTINUED | OUTPATIENT
Start: 2022-08-23 | End: 2022-08-23 | Stop reason: HOSPADM

## 2022-08-23 RX ORDER — HYDROMORPHONE HYDROCHLORIDE 2 MG/ML
INJECTION, SOLUTION INTRAMUSCULAR; INTRAVENOUS; SUBCUTANEOUS PRN
Status: DISCONTINUED | OUTPATIENT
Start: 2022-08-23 | End: 2022-08-23 | Stop reason: SURG

## 2022-08-23 RX ORDER — ONDANSETRON 2 MG/ML
INJECTION INTRAMUSCULAR; INTRAVENOUS PRN
Status: DISCONTINUED | OUTPATIENT
Start: 2022-08-23 | End: 2022-08-23 | Stop reason: SURG

## 2022-08-23 RX ORDER — ALBUTEROL SULFATE 2.5 MG/3ML
2.5 SOLUTION RESPIRATORY (INHALATION)
Status: DISCONTINUED | OUTPATIENT
Start: 2022-08-23 | End: 2022-08-23 | Stop reason: HOSPADM

## 2022-08-23 RX ORDER — OXYCODONE HCL 5 MG/5 ML
10 SOLUTION, ORAL ORAL
Status: DISCONTINUED | OUTPATIENT
Start: 2022-08-23 | End: 2022-08-23 | Stop reason: HOSPADM

## 2022-08-23 RX ORDER — LIDOCAINE HYDROCHLORIDE AND EPINEPHRINE 10; 10 MG/ML; UG/ML
INJECTION, SOLUTION INFILTRATION; PERINEURAL
Status: DISCONTINUED | OUTPATIENT
Start: 2022-08-23 | End: 2022-08-23 | Stop reason: HOSPADM

## 2022-08-23 RX ORDER — METOCLOPRAMIDE HYDROCHLORIDE 5 MG/ML
INJECTION INTRAMUSCULAR; INTRAVENOUS PRN
Status: DISCONTINUED | OUTPATIENT
Start: 2022-08-23 | End: 2022-08-23 | Stop reason: SURG

## 2022-08-23 RX ORDER — GABAPENTIN 300 MG/1
300 CAPSULE ORAL ONCE
Status: COMPLETED | OUTPATIENT
Start: 2022-08-23 | End: 2022-08-23

## 2022-08-23 RX ORDER — CEFAZOLIN SODIUM 1 G/3ML
INJECTION, POWDER, FOR SOLUTION INTRAMUSCULAR; INTRAVENOUS PRN
Status: DISCONTINUED | OUTPATIENT
Start: 2022-08-23 | End: 2022-08-23 | Stop reason: SURG

## 2022-08-23 RX ORDER — HYDROMORPHONE HYDROCHLORIDE 1 MG/ML
0.4 INJECTION, SOLUTION INTRAMUSCULAR; INTRAVENOUS; SUBCUTANEOUS
Status: DISCONTINUED | OUTPATIENT
Start: 2022-08-23 | End: 2022-08-23 | Stop reason: HOSPADM

## 2022-08-23 RX ORDER — MIDAZOLAM HYDROCHLORIDE 1 MG/ML
INJECTION INTRAMUSCULAR; INTRAVENOUS PRN
Status: DISCONTINUED | OUTPATIENT
Start: 2022-08-23 | End: 2022-08-23 | Stop reason: SURG

## 2022-08-23 RX ORDER — OXYCODONE HCL 10 MG/1
10 TABLET, FILM COATED, EXTENDED RELEASE ORAL ONCE
Status: COMPLETED | OUTPATIENT
Start: 2022-08-23 | End: 2022-08-23

## 2022-08-23 RX ORDER — KETOROLAC TROMETHAMINE 30 MG/ML
INJECTION, SOLUTION INTRAMUSCULAR; INTRAVENOUS PRN
Status: DISCONTINUED | OUTPATIENT
Start: 2022-08-23 | End: 2022-08-23 | Stop reason: SURG

## 2022-08-23 RX ORDER — ROCURONIUM BROMIDE 10 MG/ML
INJECTION, SOLUTION INTRAVENOUS PRN
Status: DISCONTINUED | OUTPATIENT
Start: 2022-08-23 | End: 2022-08-23 | Stop reason: SURG

## 2022-08-23 RX ORDER — PHENYLEPHRINE HCL IN 0.9% NACL 0.5 MG/5ML
SYRINGE (ML) INTRAVENOUS PRN
Status: DISCONTINUED | OUTPATIENT
Start: 2022-08-23 | End: 2022-08-23 | Stop reason: SURG

## 2022-08-23 RX ORDER — HYDROMORPHONE HYDROCHLORIDE 1 MG/ML
0.1 INJECTION, SOLUTION INTRAMUSCULAR; INTRAVENOUS; SUBCUTANEOUS
Status: DISCONTINUED | OUTPATIENT
Start: 2022-08-23 | End: 2022-08-23 | Stop reason: HOSPADM

## 2022-08-23 RX ORDER — HALOPERIDOL 5 MG/ML
1 INJECTION INTRAMUSCULAR
Status: DISCONTINUED | OUTPATIENT
Start: 2022-08-23 | End: 2022-08-23 | Stop reason: HOSPADM

## 2022-08-23 RX ORDER — LIDOCAINE HYDROCHLORIDE 20 MG/ML
INJECTION, SOLUTION EPIDURAL; INFILTRATION; INTRACAUDAL; PERINEURAL PRN
Status: DISCONTINUED | OUTPATIENT
Start: 2022-08-23 | End: 2022-08-23 | Stop reason: SURG

## 2022-08-23 RX ORDER — ONDANSETRON 2 MG/ML
4 INJECTION INTRAMUSCULAR; INTRAVENOUS
Status: DISCONTINUED | OUTPATIENT
Start: 2022-08-23 | End: 2022-08-23 | Stop reason: HOSPADM

## 2022-08-23 RX ORDER — LIDOCAINE HYDROCHLORIDE 10 MG/ML
INJECTION, SOLUTION INFILTRATION; PERINEURAL
Status: DISCONTINUED
Start: 2022-08-23 | End: 2022-08-23 | Stop reason: HOSPADM

## 2022-08-23 RX ORDER — OXYCODONE HCL 5 MG/5 ML
5 SOLUTION, ORAL ORAL
Status: DISCONTINUED | OUTPATIENT
Start: 2022-08-23 | End: 2022-08-23 | Stop reason: HOSPADM

## 2022-08-23 RX ORDER — OXYMETAZOLINE HYDROCHLORIDE 0.05 G/100ML
SPRAY NASAL
Status: DISCONTINUED
Start: 2022-08-23 | End: 2022-08-23 | Stop reason: HOSPADM

## 2022-08-23 RX ADMIN — DEXAMETHASONE SODIUM PHOSPHATE 4 MG: 4 INJECTION, SOLUTION INTRA-ARTICULAR; INTRALESIONAL; INTRAMUSCULAR; INTRAVENOUS; SOFT TISSUE at 08:08

## 2022-08-23 RX ADMIN — SODIUM CHLORIDE, POTASSIUM CHLORIDE, SODIUM LACTATE AND CALCIUM CHLORIDE: 600; 310; 30; 20 INJECTION, SOLUTION INTRAVENOUS at 08:02

## 2022-08-23 RX ADMIN — MIDAZOLAM HYDROCHLORIDE 2 MG: 1 INJECTION, SOLUTION INTRAMUSCULAR; INTRAVENOUS at 08:03

## 2022-08-23 RX ADMIN — ACETAMINOPHEN 1000 MG: 500 TABLET ORAL at 07:39

## 2022-08-23 RX ADMIN — CEFAZOLIN 2 G: 330 INJECTION, POWDER, FOR SOLUTION INTRAMUSCULAR; INTRAVENOUS at 08:08

## 2022-08-23 RX ADMIN — FENTANYL CITRATE 100 MCG: 50 INJECTION, SOLUTION INTRAMUSCULAR; INTRAVENOUS at 08:06

## 2022-08-23 RX ADMIN — METOCLOPRAMIDE 10 MG: 5 INJECTION, SOLUTION INTRAMUSCULAR; INTRAVENOUS at 08:50

## 2022-08-23 RX ADMIN — ONDANSETRON 4 MG: 2 INJECTION INTRAMUSCULAR; INTRAVENOUS at 08:50

## 2022-08-23 RX ADMIN — OXYCODONE HYDROCHLORIDE 10 MG: 10 TABLET, FILM COATED, EXTENDED RELEASE ORAL at 07:39

## 2022-08-23 RX ADMIN — GABAPENTIN 300 MG: 300 CAPSULE ORAL at 07:40

## 2022-08-23 RX ADMIN — PROPOFOL 150 MG: 10 INJECTION, EMULSION INTRAVENOUS at 08:06

## 2022-08-23 RX ADMIN — HYDROMORPHONE HYDROCHLORIDE 0.5 MG: 2 INJECTION INTRAMUSCULAR; INTRAVENOUS; SUBCUTANEOUS at 08:21

## 2022-08-23 RX ADMIN — KETOROLAC TROMETHAMINE 30 MG: 30 INJECTION, SOLUTION INTRAMUSCULAR at 08:50

## 2022-08-23 RX ADMIN — Medication 100 MCG: at 08:29

## 2022-08-23 RX ADMIN — ROCURONIUM BROMIDE 50 MG: 10 INJECTION, SOLUTION INTRAVENOUS at 08:06

## 2022-08-23 RX ADMIN — SUGAMMADEX 200 MG: 100 INJECTION, SOLUTION INTRAVENOUS at 09:04

## 2022-08-23 RX ADMIN — LIDOCAINE HYDROCHLORIDE 60 MG: 20 INJECTION, SOLUTION EPIDURAL; INFILTRATION; INTRACAUDAL at 08:06

## 2022-08-23 RX ADMIN — SODIUM CHLORIDE, POTASSIUM CHLORIDE, SODIUM LACTATE AND CALCIUM CHLORIDE: 600; 310; 30; 20 INJECTION, SOLUTION INTRAVENOUS at 07:41

## 2022-08-23 RX ADMIN — HYDROMORPHONE HYDROCHLORIDE 0.5 MG: 2 INJECTION INTRAMUSCULAR; INTRAVENOUS; SUBCUTANEOUS at 08:51

## 2022-08-23 ASSESSMENT — PAIN DESCRIPTION - PAIN TYPE
TYPE: SURGICAL PAIN
TYPE: ACUTE PAIN
TYPE: SURGICAL PAIN

## 2022-08-23 ASSESSMENT — PAIN SCALES - GENERAL: PAIN_LEVEL: 3

## 2022-08-23 NOTE — ANESTHESIA PROCEDURE NOTES
Airway    Date/Time: 8/23/2022 8:07 AM  Performed by: Hussein Chaudhary D.O.  Authorized by: Hussein Chaudhary D.O.     Location:  OR  Urgency:  Elective  Difficult Airway: No    Indications for Airway Management:  Anesthesia      Spontaneous Ventilation: absent    Sedation Level:  Deep  Preoxygenated: Yes    Patient Position:  Sniffing  Final Airway Type:  Endotracheal airway  Final Endotracheal Airway:  ETT  Cuffed: Yes    Technique Used for Successful ETT Placement:  Direct laryngoscopy    Insertion Site:  Oral  Blade Type:  Gaston  Laryngoscope Blade/Videolaryngoscope Blade Size:  3  ETT Size (mm):  7.5  Measured from:  Lips  ETT to Lips (cm):  21  Placement Verified by: auscultation and capnometry    Cormack-Lehane Classification:  Grade I - full view of glottis  Number of Attempts at Approach:  1

## 2022-08-23 NOTE — DISCHARGE INSTRUCTIONS
If any questions arise, call your provider.  If your provider is not available, please feel free to call the Surgical Center at [phone].    MEDICATIONS: Resume taking daily medication.  Take prescribed pain medication with food.  If no medication is prescribed, you may take non-aspirin pain medication if needed.  PAIN MEDICATION CAN BE VERY CONSTIPATING.  Take a stool softener or laxative such as senokot, pericolace, or milk of magnesia if needed.    Last pain medication given at 0739 Tylenol, oxycodone CR, Gabapentin. Next dose of tylenol if needed 1:39     Nasal Surgery Discharge Instructions    Dressing Care:    If you have packing leave it in place until your provider removes it.    If tape and a splint are on the outside of the nose, DO NOT REMOVE; only the doctor should remove this. If the splint comes loose, call the doctor's office immediately.    A constant, slightly bloody, watery drainage is normal as long as the nose is packed. The drip pad is there to absorb any drainage from the nose and may be changed if it becomes soiled.    A headache and pain behind the eyes and sinuses is normal whenever the nose is packed tightly. There may also be a stuffy sensation in the ears while the nose is packed. Theses usually subside after the packing is removed.     You may be more comfortable sleeping under a vaporizer both before and after packing is removed. Your mouth will become dry with the nose packed. Drinking plenty of liquids will also help.    ACTIVITIES: No heavy lifting, straining or prolonged bending over for two weeks after surgery. These activities might tend to cause bleeding.  Avoid any physical contact sports or situations in which the nose could be bumped hard or injured.  Normally the nose is tender to touch; this is not unusual.    BATHING: You may shower or bathe after leaving the hospital. Avoid overly hot or steamy showers or baths.     Ice: Ice packs to the nose and eyes may relieve  some of the discomfort.    DRIVING: You may drive whenever you are off pain medications.    Prescription Norco  See attached handout   Follow up with Dr. Amezcua on 8/31  What to Expect Post Anesthesia    Rest and take it easy for the first 24 hours.  A responsible adult is recommended to remain with you during that time.  It is normal to feel sleepy.  We encourage you to not do anything that requires balance, judgment or coordination.    FOR 24 HOURS DO NOT:  Drive, operate machinery or run household appliances.  Drink beer or alcoholic beverages.  Make important decisions or sign legal documents.    To avoid nausea, slowly advance diet as tolerated, avoiding spicy or greasy foods for the first day.  Add more substantial food to your diet according to your provider's instructions.  Babies can be fed formula or breast milk as soon as they are hungry.  INCREASE FLUIDS AND FIBER TO AVOID CONSTIPATION.    MILD FLU-LIKE SYMPTOMS ARE NORMAL.  YOU MAY EXPERIENCE GENERALIZED MUSCLE ACHES, THROAT IRRITATION, HEADACHE AND/OR SOME NAUSEA.

## 2022-08-23 NOTE — ANESTHESIA PREPROCEDURE EVALUATION
Case: [id number] Date/Time: 08/23/22 0745    Procedures:       SEPTOPLASTY, BILATERAL INFERIOR TURBINATE REDUCTION      INFERIOR TURBINATE REDUCTION (Bilateral)    Pre-op diagnosis: DEVIATED NASAL SEPTUM    Location: [facility] ROOM 22 / SURGERY SAME DAY [facility]    Surgeons: Adrienne Amezcua M.D.          Relevant Problems   NEURO   (positive) Intractable migraine with aura without status migrainosus   (positive) Partial epilepsy with impairment of consciousness, intractable (HCC)      CARDIAC   (positive) Intractable migraine with aura without status migrainosus       Physical Exam    Airway   Mallampati: II  TM distance: >3 FB  Neck ROM: full       Cardiovascular - normal exam  Rhythm: regular  Rate: normal  (-) murmur     Dental - normal exam           Pulmonary - normal exam  Breath sounds clear to auscultation     Abdominal    Neurological - normal exam                 Anesthesia Plan    ASA 3   ASA physical status 3 criteria: other (comment)    Plan - general       Airway plan will be ETT          Induction: intravenous    Postoperative Plan: Postoperative administration of opioids is intended.    Pertinent diagnostic labs and testing reviewed    Informed Consent:    Anesthetic plan and risks discussed with patient.    Use of blood products discussed with: patient whom consented to blood products.

## 2022-08-23 NOTE — OP REPORT
DATE OF SERVICE: 08/23/22       PREOPERATIVE DIAGNOSES:  1. Nasal septal deviation  2. Inferior turbinate hypertrophy     POSTOPERATIVE DIAGNOSES.  1. Nasal septal deviation  2. Inferior turbinate hypertrophy     PROCEDURES:  1.  Septoplasty   2.  Bilateral Inferior turbinate submucous resection with outfracture     SURGEON:  Adrienne Amezcua MD     ANESTHESIA:  General.     ANESTHESIOLOGIST: Hussein Chaudhary D.O.       ESTIMATED BLOOD LOSS:  100 mL.     FINDINGS:     Severe septal deviation to the left with large spur, small spur to the right  Turbinate hypertrophy right greater than left      COMPLICATIONS:  None.     SPECIMENS:  none.     INDICATIONS FOR PROCEDURE:  The patient is a 37 y.o. year-old female with a   longstanding history of nasal obstruction despite adequate medical therapy.  As such, the above stated procedures were offered and the patient desired strongly to proceed.  These were explained in detail.  Risks were discussed including but not limited to pain, bleeding, infection, scar formation, damage to the orbit or skull base, changes in the smell, nasal septal perforation, recurrence of symptoms and   need for further procedures.  Informed surgical consent was obtained.     DESCRIPTION OF PROCEDURE:  The patient was met in the preoperative holding area and again the consent and history were reviewed. She was brought back to   the operating room in good condition.  After appropriate anesthetic markers   were placed, a surgical time-out was taken and general endotracheal anesthesia   was induced.  The bed was then turned to 180 degrees.  The nasal cavity was   examined with the findings as noted above.  Lidocaine 1% with epinephrine was   infiltrated into the septum for local anesthesia and topical epinephrine-soaked cottonoids were placed into the nose for decongestion.  The patient was then prepped and draped in the usual sterile fashion. A zero degree olivera alberto was used to evaluate the nasal  cavity with the findings as noted above. I first began by making a hemitransfixion incision along the caudal edge of the septum on the left hand side.  The subperichondrial plane was identified and this plane was elevated widely initially on the left  hand side.  A transcartilaginous incision was then made and the contralateral flap was elevated. The deviated portions of the cartilage and bone were then addressed. There was a low spur left more than right and a high deviation to the left. Care was taken to leave at least a 1.5cm caudal strut. A linear rent was noted in the flap along the large septal bony spur. The right flap was entirely intact. This created a widely patent airway.      Next the inferior turbinates were addressed.  First the right turbinate was addressed. It was injected with an additional 1cc of lido/epi.  A small submucosal pocket was created with a  freer.  Next the microdebrider was used to perform a submucosal reduction.  After which the turbinate was lateralized with a freer elevator. A small portion of the hypertrophied turbinate was trimmed as it was severely obstructing. The posterior mullberry tip was cauterized using suction bovie.  Next, the left turbinate was reduced in a similar fashion.    The hemitransfixion incision was closed using 5-0 fast absorbing gut and a quilting stitch of 4-0 plain gut was used to reaproximate the flaps. The procedure was then terminated. Care of the patient was returned to anesthesia for emergence and extubation. She was taken to the PACU in stable condition. There were no apparent complications and all instrument counts were correct x2.          ____________________________________     Adrienne Amezcua MD

## 2022-08-23 NOTE — OR NURSING
0914 Patient arrived from OR. ID verified. Report received. Attached to monitors. Patient sleep easy to arouse. Respirations even and non -labored. 6L 02 via 02 Mask. Vital signs stable.   0927 Patient tolerating po fluids. Denies pain, denies nausea.   0949 called and updated family plan of care.   1006 Discharge instructions given to patient and family verbalize understanding of the orders. Copy of instructions given to patient.  1014 Criteria met to transition patient to stage 2 recovery  1023 Patient escorted via w/c to responsible adult with all personal belongings.

## 2022-08-23 NOTE — ANESTHESIA TIME REPORT
Anesthesia Start and Stop Event Times     Date Time Event    8/23/2022 0658 Ready for Procedure     0802 Anesthesia Start     0917 Anesthesia Stop        Responsible Staff  08/23/22    Name Role Begin End    Hussein Chaudhary D.O. Anesth 0802 0917        Overtime Reason:  no overtime (within assigned shift)    Comments:

## 2022-08-23 NOTE — ANESTHESIA POSTPROCEDURE EVALUATION
Patient: Shivani Wiley    Procedure Summary     Date: 08/23/22 Room / Location: [facility] ROOM 22 / SURGERY SAME DAY [facility]    Anesthesia Start: 0802 Anesthesia Stop: 0917    Procedures:       SEPTOPLASTY, BILATERAL INFERIOR TURBINATE REDUCTION (Nose)      INFERIOR TURBINATE REDUCTION (Bilateral: Nose) Diagnosis: (DEVIATED NASAL SEPTUM)    Surgeons: Adrienne Amezcua M.D. Responsible Provider: Hussein Chaudhary D.O.    Anesthesia Type: general ASA Status: 3          Final Anesthesia Type: general  Last vitals  BP   Blood Pressure: 113/72    Temp   36.5 °C (97.7 °F)    Pulse   65   Resp   18    SpO2   98 %      Anesthesia Post Evaluation    Patient location during evaluation: PACU  Patient participation: complete - patient participated  Level of consciousness: awake and alert  Pain score: 3    Airway patency: patent  Anesthetic complications: no  Cardiovascular status: hemodynamically stable  Respiratory status: acceptable  Hydration status: euvolemic    PONV: none          No notable events documented.     Nurse Pain Score: 0 (NPRS)

## 2023-01-02 ENCOUNTER — APPOINTMENT (OUTPATIENT)
Dept: URGENT CARE | Facility: CLINIC | Age: 38
End: 2023-01-02
Payer: COMMERCIAL

## 2023-01-02 ENCOUNTER — OFFICE VISIT (OUTPATIENT)
Dept: URGENT CARE | Facility: CLINIC | Age: 38
End: 2023-01-02
Payer: COMMERCIAL

## 2023-01-02 VITALS
HEIGHT: 63 IN | TEMPERATURE: 98.3 F | OXYGEN SATURATION: 98 % | DIASTOLIC BLOOD PRESSURE: 76 MMHG | SYSTOLIC BLOOD PRESSURE: 126 MMHG | BODY MASS INDEX: 31.89 KG/M2 | WEIGHT: 180 LBS | RESPIRATION RATE: 16 BRPM | HEART RATE: 90 BPM

## 2023-01-02 DIAGNOSIS — J02.9 PHARYNGITIS, UNSPECIFIED ETIOLOGY: ICD-10-CM

## 2023-01-02 PROBLEM — G43.709 CHRONIC MIGRAINE WITHOUT AURA WITHOUT STATUS MIGRAINOSUS, NOT INTRACTABLE: Status: ACTIVE | Noted: 2022-07-29

## 2023-01-02 PROBLEM — R73.03 PREDIABETES: Status: ACTIVE | Noted: 2022-06-02

## 2023-01-02 LAB
INT CON NEG: NORMAL
INT CON POS: NORMAL
S PYO AG THROAT QL: NEGATIVE

## 2023-01-02 PROCEDURE — 99203 OFFICE O/P NEW LOW 30 MIN: CPT | Performed by: PHYSICIAN ASSISTANT

## 2023-01-02 PROCEDURE — 87880 STREP A ASSAY W/OPTIC: CPT | Performed by: PHYSICIAN ASSISTANT

## 2023-01-02 RX ORDER — LIRAGLUTIDE 6 MG/ML
INJECTION SUBCUTANEOUS
COMMUNITY
Start: 2022-11-25

## 2023-01-02 ASSESSMENT — ENCOUNTER SYMPTOMS
COUGH: 0
ABDOMINAL PAIN: 0
DIZZINESS: 0
SHORTNESS OF BREATH: 0
NAUSEA: 0
CHILLS: 0
EYE DISCHARGE: 0
DIARRHEA: 0
CONSTIPATION: 0
DIAPHORESIS: 0
SORE THROAT: 1
EYE PAIN: 0
EYE REDNESS: 0
HEADACHES: 1
WHEEZING: 0
FEVER: 0
SINUS PAIN: 0
VOMITING: 0

## 2023-01-02 NOTE — PROGRESS NOTES
"  Subjective:     Shivani Wiley  is a 37 y.o. female who presents for Pharyngitis (X5 days, \"Headache, loss of voice\")         She presents today with pharyngitis x2 days.  Associated headache and laryngitis.  Denies any recent known close sick contacts.  Is having pain with swallowing, denies dysphagia.  Was having a fever during the first 2 days of symptom onset but this has fully resolved.  No chest pain or shortness of breath, no nausea or vomiting, no abdominal pain, no diarrhea.     Review of Systems   Constitutional:  Negative for chills, diaphoresis, fever and malaise/fatigue.   HENT:  Positive for sore throat. Negative for congestion, ear discharge and sinus pain.    Eyes:  Negative for pain, discharge and redness.   Respiratory:  Negative for cough, shortness of breath and wheezing.    Cardiovascular:  Negative for chest pain.   Gastrointestinal:  Negative for abdominal pain, constipation, diarrhea, nausea and vomiting.   Neurological:  Positive for headaches. Negative for dizziness.    No Known Allergies  Past Medical History:   Diagnosis Date    Localization-related (focal) (partial) epilepsy and epileptic syndromes with complex partial seizures, with intractable epilepsy     EEG abn'l, MRI WNL    Other sleep disturbances         Objective:   /76 (BP Location: Right arm, Patient Position: Sitting, BP Cuff Size: Adult)   Pulse 90   Temp 36.8 °C (98.3 °F) (Temporal)   Resp 16   Ht 1.6 m (5' 3\")   Wt 81.6 kg (180 lb)   SpO2 98%   BMI 31.89 kg/m²   Physical Exam  Vitals and nursing note reviewed.   Constitutional:       General: She is not in acute distress.     Appearance: Normal appearance. She is not ill-appearing, toxic-appearing or diaphoretic.   HENT:      Head: Normocephalic.      Right Ear: Tympanic membrane, ear canal and external ear normal. There is no impacted cerumen.      Left Ear: Tympanic membrane, ear canal and external ear normal. There is no impacted cerumen.      Nose: No " congestion or rhinorrhea.      Mouth/Throat:      Mouth: Mucous membranes are moist.      Pharynx: No oropharyngeal exudate or posterior oropharyngeal erythema.   Eyes:      General:         Right eye: No discharge.         Left eye: No discharge.      Conjunctiva/sclera: Conjunctivae normal.   Cardiovascular:      Rate and Rhythm: Normal rate and regular rhythm.   Pulmonary:      Effort: Pulmonary effort is normal. No respiratory distress.      Breath sounds: Normal breath sounds. No stridor. No wheezing or rhonchi.   Musculoskeletal:      Cervical back: Neck supple.   Lymphadenopathy:      Cervical: No cervical adenopathy.   Neurological:      General: No focal deficit present.      Mental Status: She is alert and oriented to person, place, and time.   Psychiatric:         Mood and Affect: Mood normal.         Behavior: Behavior normal.         Thought Content: Thought content normal.         Judgment: Judgment normal.           Diagnostic testing:    POC strep-negative    Assessment/Plan:     Encounter Diagnoses   Name Primary?    Pharyngitis, unspecified etiology           Plan for care for today's complaint includes over-the-counter medications for symptom support.  Strep test was negative today, symptoms are likely viral in nature.  Did offer oral Decadron in office today; however, patient did decline this treatment plan and would like to continue with over-the-counter medications.  Continue to monitor symptoms and return to urgent care or follow-up with primary care provider if symptoms remain ongoing.  Follow-up in the emergency department if symptoms become severe, ER precautions discussed in office today..    See AVS Instructions below for written guidance provided to patient on after-visit management and care in addition to our verbal discussion during the visit.    Please note that this dictation was created using voice recognition software. I have attempted to correct all errors, but there may be  sound-alike, spelling, grammar and possibly content errors that I did not discover before finalizing the note.    Oscar Uzair DURHAM

## (undated) DEVICE — WATER IRRIGATION STERILE 1000ML (12EA/CA)

## (undated) DEVICE — TUBING ENDOSCRUB II (5EA/PK)

## (undated) DEVICE — TUBE CONNECT SUCTION CLEAR 120 X 1/4" (50EA/CA)"

## (undated) DEVICE — BOVIE FOOT CONTROL SUCTION - 6IN 10FR (25EA/CA)

## (undated) DEVICE — SUTURE 5-0 PLAIN GUT PC-1 - (12/BX)

## (undated) DEVICE — SUCTION INSTRUMENT YANKAUER BULBOUS TIP W/O VENT (50EA/CA)

## (undated) DEVICE — SUTURE 4-0 PLAIN GUT SC-1 ETHICON (36PK/BX)

## (undated) DEVICE — LACTATED RINGERS INJ 1000 ML - (14EA/CA 60CA/PF)

## (undated) DEVICE — CANISTER SUCTION 3000ML MECHANICAL FILTER AUTO SHUTOFF MEDI-VAC NONSTERILE LF DISP  (40EA/CA)

## (undated) DEVICE — SUTURE GENERAL

## (undated) DEVICE — SET LEADWIRE 5 LEAD BEDSIDE DISPOSABLE ECG (1SET OF 5/EA)

## (undated) DEVICE — TUBING CLEARLINK DUO-VENT - C-FLO (48EA/CA)

## (undated) DEVICE — GOWN SURGEONS LARGE - (32/CA)

## (undated) DEVICE — SHEATH SHARP SITE 30 DEGREE ENDOSCRUB II (5EA/PK)

## (undated) DEVICE — GLOVE SZ 6.5 BIOGEL PI MICRO - PF LF (50PR/BX)

## (undated) DEVICE — TUBE CONNECTING SUCTION - CLEAR PLASTIC STERILE 72 IN (50EA/CA)

## (undated) DEVICE — ANTI-FOG SOLUTION - 60BTL/CA

## (undated) DEVICE — SODIUM CHL IRRIGATION 0.9% 1000ML (12EA/CA)

## (undated) DEVICE — SLEEVE VASO CALF MED - (10PR/CA)

## (undated) DEVICE — KIT  I.V. START (100EA/CA)

## (undated) DEVICE — PATTIES SURG X-RAYCOTTONOID - 1/2 X 3 IN (200/CA)

## (undated) DEVICE — CANISTER SUCTION RIGID RED 1500CC (40EA/CA)

## (undated) DEVICE — TOWEL STOP TIMEOUT SAFETY FLAG (40EA/CA)

## (undated) DEVICE — SENSOR OXIMETER ADULT SPO2 RD SET (20EA/BX)

## (undated) DEVICE — PACK ENT OR - (2EA/CA)

## (undated) DEVICE — BLADE INFERIOR TURBINATE 2.9MM (5EA/PK)